# Patient Record
Sex: FEMALE | Race: WHITE | NOT HISPANIC OR LATINO | Employment: OTHER | URBAN - NONMETROPOLITAN AREA
[De-identification: names, ages, dates, MRNs, and addresses within clinical notes are randomized per-mention and may not be internally consistent; named-entity substitution may affect disease eponyms.]

---

## 2019-04-25 ENCOUNTER — OFFICE VISIT (OUTPATIENT)
Dept: CARDIOLOGY | Facility: CLINIC | Age: 59
End: 2019-04-25

## 2019-04-25 VITALS
BODY MASS INDEX: 25.11 KG/M2 | SYSTOLIC BLOOD PRESSURE: 106 MMHG | DIASTOLIC BLOOD PRESSURE: 70 MMHG | HEIGHT: 67 IN | HEART RATE: 61 BPM | WEIGHT: 160 LBS

## 2019-04-25 DIAGNOSIS — E78.00 HYPERCHOLESTEREMIA: ICD-10-CM

## 2019-04-25 DIAGNOSIS — R07.2 PRECORDIAL PAIN: ICD-10-CM

## 2019-04-25 DIAGNOSIS — I10 ESSENTIAL HYPERTENSION: Primary | ICD-10-CM

## 2019-04-25 DIAGNOSIS — R00.2 PALPITATIONS: ICD-10-CM

## 2019-04-25 DIAGNOSIS — Z72.0 TOBACCO USE: ICD-10-CM

## 2019-04-25 DIAGNOSIS — R42 DIZZINESS: ICD-10-CM

## 2019-04-25 DIAGNOSIS — R63.4 WEIGHT LOSS: ICD-10-CM

## 2019-04-25 PROCEDURE — 99244 OFF/OP CNSLTJ NEW/EST MOD 40: CPT | Performed by: INTERNAL MEDICINE

## 2019-04-25 PROCEDURE — 93000 ELECTROCARDIOGRAM COMPLETE: CPT | Performed by: INTERNAL MEDICINE

## 2019-04-25 PROCEDURE — 99406 BEHAV CHNG SMOKING 3-10 MIN: CPT | Performed by: INTERNAL MEDICINE

## 2019-04-25 RX ORDER — CLONAZEPAM 1 MG/1
1 TABLET ORAL NIGHTLY
COMMUNITY
End: 2019-07-25 | Stop reason: ALTCHOICE

## 2019-04-25 RX ORDER — MELOXICAM 15 MG/1
15 TABLET ORAL DAILY
COMMUNITY

## 2019-04-25 RX ORDER — ATORVASTATIN CALCIUM 10 MG/1
10 TABLET, FILM COATED ORAL DAILY
Qty: 30 TABLET | Refills: 11 | Status: SHIPPED | OUTPATIENT
Start: 2019-04-25 | End: 2019-07-25 | Stop reason: DRUGHIGH

## 2019-04-25 RX ORDER — RANITIDINE 300 MG/1
300 TABLET ORAL NIGHTLY
Qty: 30 TABLET | Refills: 8 | Status: SHIPPED | OUTPATIENT
Start: 2019-04-25

## 2019-04-25 RX ORDER — DULOXETIN HYDROCHLORIDE 30 MG/1
30 CAPSULE, DELAYED RELEASE ORAL DAILY
COMMUNITY

## 2019-04-25 RX ORDER — LISINOPRIL AND HYDROCHLOROTHIAZIDE 20; 12.5 MG/1; MG/1
1 TABLET ORAL DAILY
COMMUNITY

## 2019-04-25 RX ORDER — GABAPENTIN 300 MG/1
300 CAPSULE ORAL 2 TIMES DAILY
COMMUNITY

## 2019-04-25 NOTE — PROGRESS NOTES
Chief Complaint   Patient presents with   • Establish Care     Referred for chest pain and HTN.  recently passed away from a heart attack. Has not seen a cardiologist before, thinks she may have had a stress test around 16 years ago before surgery, will attempt to obtain. Reports that sometimes her heart feels like it is running like a horse and gets light headed. Does get short of breath. Reports that she has unintentionally lost weight over the last couple of weeks. Last lab work was done on 19 per PCP, in referral.    • Aspirin     Patient is not on aspirin.         CARDIAC COMPLAINTS  chest pressure/discomfort, dyspnea and fatigue      Subjective   Kasey Torres is a 58 y.o. female came in today for her initial cardiac evaluation.  She has history of hypertension, history of hypercholesterolemia who has been having episodes of chest pain for the last few months.  Her  apparently passed away secondary to heart attack and since then she has noticed chest pain in the form of sharp pain which lasts for few seconds to few minutes and subsides.  She also noticed that her heart races and she gets lightheaded with that.  She also developed shortness of breath during these episodes.  She also has not been eating properly and has also lost weight.  She used to be on a statin which apparently was stopped secondary to the weight loss.  She denies having any syncopal episode.  She denies having any blood in the stool.  She denies having any chest x-ray done for the last couple of years.  She does have constipation but has not  undergone any colonoscopy in the past.  Her lab work which was done at the office in January showed blood sugar of 100, normal liver enzymes, total cholesterol of 229 with the LDL of 148.  She does have a family history of heart disease.  Her brother  at age of 58 secondary to heart attack.  Both her parents  at the age of 60 and they had hypertension.    Past Surgical  History:   Procedure Laterality Date   • CARDIOVASCULAR STRESS TEST  06/24/2004    @ [facility].- 7 Min. 10 METS. 86% THR. EF 59%. Negative.       Current Outpatient Medications   Medication Sig Dispense Refill   • clonazePAM (KlonoPIN) 1 MG tablet Take 1 mg by mouth Every Night.     • DULoxetine (CYMBALTA) 30 MG capsule Take 30 mg by mouth Daily.     • gabapentin (NEURONTIN) 300 MG capsule Take 300 mg by mouth 2 (Two) Times a Day.     • lisinopril-hydrochlorothiazide (PRINZIDE,ZESTORETIC) 20-12.5 MG per tablet Take 1 tablet by mouth Daily.     • meloxicam (MOBIC) 15 MG tablet Take 15 mg by mouth Daily.     • atorvastatin (LIPITOR) 10 MG tablet Take 1 tablet by mouth Daily. 30 tablet 11   • nicotine (NICOTROL) 10 MG inhaler Inhale 1 puff As Needed for Smoking Cessation. 125 inhaler 4   • raNITIdine (ZANTAC) 300 MG tablet Take 1 tablet by mouth Every Night. 30 tablet 8     No current facility-administered medications for this visit.            ALLERGIES:  Patient has no known allergies.    Past Medical History:   Diagnosis Date   • Anxiety    • DDD (degenerative disc disease), cervical    • Dysthymic disorder    • History of neck surgery    • Hx of appendectomy    • Hx of hysterectomy    • Hx of tonsillectomy    • Hypertension    • Insomnia        Social History     Tobacco Use   Smoking Status Current Every Day Smoker   • Packs/day: 0.25   • Types: Cigarettes   Smokeless Tobacco Never Used   Tobacco Comment    1 pack of cigarettes will last here about 2.5 days. Quit in the past using Chantix before, but started smoking when her  passed away.           Family History   Problem Relation Age of Onset   • Hypertension Mother    • Hypertension Father    • Heart attack Brother    • Hypertension Brother    • Heart disease Brother    • Hypertension Brother    • Stroke Brother        Review of Systems   Constitution: Positive for weakness and weight loss. Negative for decreased appetite and malaise/fatigue.  "  HENT: Negative for congestion and sore throat.    Eyes: Negative for blurred vision.   Cardiovascular: Positive for chest pain, dyspnea on exertion, irregular heartbeat and palpitations.   Respiratory: Positive for shortness of breath. Negative for snoring.    Endocrine: Negative for cold intolerance and heat intolerance.   Hematologic/Lymphatic: Negative for adenopathy. Does not bruise/bleed easily.   Skin: Negative for itching, nail changes and skin cancer.   Musculoskeletal: Negative for arthritis and myalgias.   Gastrointestinal: Positive for constipation. Negative for abdominal pain, dysphagia and heartburn.   Genitourinary: Negative for bladder incontinence and frequency.   Neurological: Negative for dizziness, light-headedness, seizures and vertigo.   Psychiatric/Behavioral: Positive for depression. Negative for altered mental status.   Allergic/Immunologic: Negative for environmental allergies and hives.       Diabetes- No  Thyroid- normal    Objective     /70 (BP Location: Right arm)   Pulse 61   Ht 170.2 cm (67\")   Wt 72.6 kg (160 lb)   BMI 25.06 kg/m²     Physical Exam   Constitutional: She is oriented to person, place, and time. She appears well-developed and well-nourished.   HENT:   Head: Normocephalic.   Nose: Nose normal.   Eyes: EOM are normal. Pupils are equal, round, and reactive to light.   Neck: Normal range of motion. Neck supple.   Cardiovascular: Normal rate, regular rhythm, S1 normal and S2 normal.   Murmur heard.  Pulmonary/Chest: Effort normal and breath sounds normal.   Abdominal: Soft. Bowel sounds are normal.   Musculoskeletal: Normal range of motion. She exhibits no edema.   Neurological: She is alert and oriented to person, place, and time.   Skin: Skin is warm and dry.   Psychiatric: She has a normal mood and affect.         ECG 12 Lead  Date/Time: 4/25/2019 12:13 PM  Performed by: Huang Michael MD  Authorized by: Huang Michael MD   Previous ECG: no previous " ECG available  Rhythm: sinus rhythm  Rate: normal  Conduction: non-specific intraventricular conduction delay  QRS axis: normal    Clinical impression: non-specific ECG              Assessment/Plan   Patient's Body mass index is 25.06 kg/m². BMI is above normal parameters. Recommendations include: nutrition counseling.     Ginger was seen today for establish care and aspirin.    Diagnoses and all orders for this visit:    Essential hypertension    Palpitations    Precordial pain  -     Stress Test With Myocardial Perfusion One Day; Future  -     raNITIdine (ZANTAC) 300 MG tablet; Take 1 tablet by mouth Every Night.    Tobacco use  -     nicotine (NICOTROL) 10 MG inhaler; Inhale 1 puff As Needed for Smoking Cessation.    Dizziness  -     Adult Transthoracic Echo Complete W/ Cont if Necessary Per Protocol; Future  -     XR Chest PA & Lateral; Future    Hypercholesteremia  -     atorvastatin (LIPITOR) 10 MG tablet; Take 1 tablet by mouth Daily.    Weight loss  -     XR Chest PA & Lateral; Future    At baseline at heart rate is stable and the blood pressure is normal.  EKG showed normal sinus rhythm, IVCD and nonspecific ST changes. Her clinical examination reveals BMI slightly elevated.  Her cardiovascular examination reveals short systolic murmur at the mitral area.  I had a long talk with her about the smoking and she willing to try to quit with the help of Nicotrol inhalers and prescription was given.  Regarding her hypertension continue the ACE inhibitors.  Regarding her chest pain, it could be related to her GI but she does have cardiac risk.  I started her on Zantac 300 mg once a day and schedule her to undergo a stress dose.  Regarding the dizziness we will get an echocardiogram to rule out any valvular heart disease.  Regarding the palpitation, if during the stress test she has increased chronotropic response on any other arrhythmia, will start her on a beta-blocker.  Regarding the hypercholesterolemia I  started her on Lipitor 10 mg once a day.  Regarding her weight loss, she will need an x-ray chest PA and lateral also talked to her about possibility for EGD and colonoscopy in the future.  Based on the results, further recommendations will be made.  Regarding her mildly elevated BMI, encouraged her to increase her protein intake and reducing carbohydrate intake..               Electronically signed by Huang Michael MD April 25, 2019 12:05 PM

## 2019-04-25 NOTE — PROGRESS NOTES
I advised Kasey of the risks of continuing to use tobacco, and I provided her with tobacco cessation educational materials in the After Visit Summary.     During this visit, I spent 3-4 minutes counseling the patient regarding tobacco cessation.

## 2019-05-01 ENCOUNTER — APPOINTMENT (OUTPATIENT)
Dept: CARDIOLOGY | Facility: HOSPITAL | Age: 59
End: 2019-05-01

## 2019-05-06 ENCOUNTER — TELEPHONE (OUTPATIENT)
Dept: CARDIOLOGY | Facility: CLINIC | Age: 59
End: 2019-05-06

## 2019-07-25 ENCOUNTER — OFFICE VISIT (OUTPATIENT)
Dept: CARDIOLOGY | Facility: CLINIC | Age: 59
End: 2019-07-25

## 2019-07-25 VITALS
HEART RATE: 72 BPM | SYSTOLIC BLOOD PRESSURE: 136 MMHG | WEIGHT: 152.2 LBS | HEIGHT: 67 IN | DIASTOLIC BLOOD PRESSURE: 82 MMHG | BODY MASS INDEX: 23.89 KG/M2

## 2019-07-25 DIAGNOSIS — I10 ESSENTIAL HYPERTENSION: Primary | ICD-10-CM

## 2019-07-25 DIAGNOSIS — Z72.0 TOBACCO USE: ICD-10-CM

## 2019-07-25 DIAGNOSIS — E78.00 HYPERCHOLESTEREMIA: ICD-10-CM

## 2019-07-25 DIAGNOSIS — R07.89 CHEST HEAVINESS: ICD-10-CM

## 2019-07-25 DIAGNOSIS — R06.02 SHORTNESS OF BREATH: ICD-10-CM

## 2019-07-25 DIAGNOSIS — F41.9 ANXIETY: ICD-10-CM

## 2019-07-25 DIAGNOSIS — Q87.40 MARFAN SYNDROME: ICD-10-CM

## 2019-07-25 PROCEDURE — 99214 OFFICE O/P EST MOD 30 MIN: CPT | Performed by: NURSE PRACTITIONER

## 2019-07-25 RX ORDER — DIAZEPAM 5 MG/1
5 TABLET ORAL NIGHTLY
COMMUNITY

## 2019-07-25 RX ORDER — ATORVASTATIN CALCIUM 20 MG/1
20 TABLET, FILM COATED ORAL DAILY
COMMUNITY

## 2019-07-25 RX ORDER — METHOCARBAMOL 750 MG/1
750 TABLET, FILM COATED ORAL 3 TIMES DAILY
COMMUNITY

## 2019-07-25 NOTE — PROGRESS NOTES
Chief Complaint   Patient presents with   • Follow-up     Cardiac management . does not take Aspirin . Most recent labs on chart. . Has recently lost her brother and she said it was thought that he had Marfans -which is genetic . she was unablle to have stress test d/t cost  , she is wondering if an Echo would be cheaper?   • Dizziness     comes and goes.Mainly when standing too fast or bending over.   • Medication refills      No refills needed today. had medication bottles with her today.       Agustín Torres is a 58 y.o. female seen April 2019 for initial cardiac evaluation.  She has history of hypertension and hypercholesterolemia who had been having episodes of chest pain. She reported episodes of heart racing associated with lightheadedness and shortness of breath.  She had stopped statin therapy due to weight loss.  Labs showed blood sugar of 100, normal liver enzymes, total cholesterol of 229 with  LDL of 148.  She does have a family history of heart disease.  At consultation Zantac was added for GI symptoms.  Nicotrol Inhaler prescribed to aid in smoking cessation and Lipitor for cholesterol management.  Cardiac work-up was recommended but she canceled due to cost.    Today she comes to the office for a follow-up visit.  Since her last visit her brother has passed away.  She was told by family that he possibly had Marfan syndrome and that she needed to be evaluated.  She continues to have issues with chest tightness and shortness of breath and is unsure if related mostly to anxiety.  Cymbalta seems to have improved symptoms better than previous medication.  Unfortunately she continues to smoke.     HPI     Cardiac History:    Past Surgical History:   Procedure Laterality Date   • CARDIOVASCULAR STRESS TEST  06/24/2004    @ [facility].- 7 Min. 10 METS. 86% THR. EF 59%. Negative.       Current Outpatient Medications   Medication Sig Dispense Refill   • atorvastatin (LIPITOR) 20 MG tablet  Take 20 mg by mouth Daily.     • diazePAM (VALIUM) 5 MG tablet Take 5 mg by mouth Every Night.     • DULoxetine (CYMBALTA) 30 MG capsule Take 30 mg by mouth Daily.     • gabapentin (NEURONTIN) 300 MG capsule Take 300 mg by mouth 2 (Two) Times a Day.     • lisinopril-hydrochlorothiazide (PRINZIDE,ZESTORETIC) 20-12.5 MG per tablet Take 1 tablet by mouth Daily.     • methocarbamol (ROBAXIN) 750 MG tablet Take 750 mg by mouth 3 (Three) Times a Day.     • nicotine (NICOTROL) 10 MG inhaler Inhale 1 puff As Needed for Smoking Cessation. 125 inhaler 4   • raNITIdine (ZANTAC) 300 MG tablet Take 1 tablet by mouth Every Night. 30 tablet 8   • meloxicam (MOBIC) 15 MG tablet Take 15 mg by mouth Daily.       No current facility-administered medications for this visit.        Patient has no known allergies.    Past Medical History:   Diagnosis Date   • Anxiety    • DDD (degenerative disc disease), cervical    • Dysthymic disorder    • History of neck surgery    • Hx of appendectomy    • Hx of hysterectomy    • Hx of tonsillectomy    • Hypertension    • Insomnia        Social History     Socioeconomic History   • Marital status:      Spouse name: Not on file   • Number of children: Not on file   • Years of education: Not on file   • Highest education level: Not on file   Tobacco Use   • Smoking status: Current Every Day Smoker     Packs/day: 0.25     Types: Cigarettes   • Smokeless tobacco: Never Used   • Tobacco comment: 1 pack of cigarettes will last here about 2.5 days. Quit in the past using Chantix before, but started smoking when her  passed away.    Substance and Sexual Activity   • Alcohol use: No     Frequency: Never   • Drug use: No       Family History   Problem Relation Age of Onset   • Hypertension Mother    • Hypertension Father    • Heart attack Brother    • Hypertension Brother    • Heart disease Brother    • Hypertension Brother    • Stroke Brother        Review of Systems   Constitution: Positive for  "decreased appetite (relates to stress and grief) and weight loss. Negative for diaphoresis, fever and weakness.   HENT: Negative.    Eyes: Negative for redness and visual disturbance.   Cardiovascular: Positive for chest pain. Negative for leg swelling, near-syncope and palpitations.   Respiratory: Positive for cough and shortness of breath. Negative for sleep disturbances due to breathing and sputum production.    Endocrine: Negative for polydipsia, polyphagia and polyuria.   Hematologic/Lymphatic: Negative.    Skin: Negative.    Musculoskeletal: Negative for joint pain and muscle weakness.   Gastrointestinal: Negative.    Genitourinary: Negative.    Neurological: Negative for dizziness, headaches, light-headedness, loss of balance, numbness and paresthesias.   Psychiatric/Behavioral: Positive for depression. Negative for suicidal ideas. The patient is nervous/anxious.         Objective     /82 (BP Location: Left arm)   Pulse 72   Ht 170.2 cm (67\")   Wt 69 kg (152 lb 3.2 oz)   BMI 23.84 kg/m²     Physical Exam   Constitutional: She is oriented to person, place, and time. She appears well-nourished.   HENT:   Head: Normocephalic.   Eyes: Conjunctivae are normal. Pupils are equal, round, and reactive to light.   Neck: Normal range of motion. Neck supple. No JVD present. Carotid bruit is not present.   Cardiovascular: Normal rate, regular rhythm, S1 normal and S2 normal.   No murmur heard.  Pulmonary/Chest: Effort normal and breath sounds normal. She has no wheezes. She has no rales.   Abdominal: Soft. Bowel sounds are normal. She exhibits no distension. There is no tenderness.   Musculoskeletal: Normal range of motion. She exhibits no edema.   Neurological: She is alert and oriented to person, place, and time.   Skin: Skin is warm and dry. No pallor.   Psychiatric: She has a normal mood and affect.        Procedures: none today        Assessment/Plan      Kasey was seen today for follow-up, dizziness and " medication refills.    Diagnoses and all orders for this visit:    Essential hypertension  -     Adult Transthoracic Echo Complete W/ Cont if Necessary Per Protocol; Future    Hypercholesteremia  -     Adult Transthoracic Echo Complete W/ Cont if Necessary Per Protocol; Future    Tobacco use  -     Adult Transthoracic Echo Complete W/ Cont if Necessary Per Protocol; Future    Marfan syndrome  -     Adult Transthoracic Echo Complete W/ Cont if Necessary Per Protocol; Future    Shortness of breath  -     Adult Transthoracic Echo Complete W/ Cont if Necessary Per Protocol; Future    Chest heaviness  -     Adult Transthoracic Echo Complete W/ Cont if Necessary Per Protocol; Future    Anxiety    Ms. Torres is concerned over family history of Marfan syndrome.  She continues to have symptoms that could be cardiac related.  We will reschedule echocardiogram.  Due to cost she wants to wait on having nuclear stress test.    Her blood pressure, heart rate, and heart rhythm today are normal.  The same dose of lisinopril-hydrochlorothiazide advised.    Patient's Body mass index is 23.84 kg/m². BMI is within normal parameters. No follow-up required.  I encouraged her on maintaining current weight and Mediterranean type diet.     She follows with you for lab orders/management.  Please forward a copy of lab results as available.  It is noted the dose of Lipitor was increased and currently tolerating well.    Kasey Torres is a current cigarettes user.  She currently smokes 1 pack of cigarettes per day for a duration of 30 years. I have educated her on the risk of diseases from using tobacco products such as cancer, COPD and heart diease.     I advised her to quit and she is willing to quit. We have discussed the following method/s for tobacco cessation:  Education Material.  Together we have set a quit date for 1 month from today.  She will follow up with me in on prn basis or sooner to check on her progress. I spent 3  minutes  counseling the patient.    She continues to have issues with anxiety however feels some better since addition of Cymbalta.  She will follow with you for management.    Further recommendations based on echo results.  A 6-month follow-up visit scheduled.  Please call sooner for cardiac concerns.            Electronically signed by NE [place]ul[place],  July 25, 2019 12:54 PM

## 2019-08-07 ENCOUNTER — HOSPITAL ENCOUNTER (OUTPATIENT)
Dept: CARDIOLOGY | Facility: HOSPITAL | Age: 59
Discharge: HOME | End: 2019-08-07
Admitting: NURSE PRACTITIONER

## 2019-08-07 DIAGNOSIS — R06.02 SHORTNESS OF BREATH: ICD-10-CM

## 2019-08-07 DIAGNOSIS — Q87.40 MARFAN SYNDROME: ICD-10-CM

## 2019-08-07 DIAGNOSIS — I10 ESSENTIAL HYPERTENSION: ICD-10-CM

## 2019-08-07 DIAGNOSIS — Z72.0 TOBACCO USE: ICD-10-CM

## 2019-08-07 DIAGNOSIS — E78.00 HYPERCHOLESTEREMIA: ICD-10-CM

## 2019-08-07 DIAGNOSIS — R07.89 CHEST HEAVINESS: ICD-10-CM

## 2019-08-07 LAB
BH CV ECHO MEAS - ACS: 2 CM
BH CV ECHO MEAS - AO MEAN PG: 2.7 MMHG
BH CV ECHO MEAS - AO ROOT AREA (BSA CORRECTED): 1.8
BH CV ECHO MEAS - AO ROOT AREA: 8.3 CM^2
BH CV ECHO MEAS - AO ROOT DIAM: 3.2 CM
BH CV ECHO MEAS - AO V2 MEAN: 76.9 CM/SEC
BH CV ECHO MEAS - AO V2 VTI: 21.9 CM
BH CV ECHO MEAS - BSA(HAYCOCK): 1.8 M^2
BH CV ECHO MEAS - BSA: 1.8 M^2
BH CV ECHO MEAS - BZI_BMI: 23.8 KILOGRAMS/M^2
BH CV ECHO MEAS - BZI_METRIC_HEIGHT: 170.2 CM
BH CV ECHO MEAS - BZI_METRIC_WEIGHT: 68.9 KG
BH CV ECHO MEAS - EDV(CUBED): 70.1 ML
BH CV ECHO MEAS - EDV(MOD-SP4): 55 ML
BH CV ECHO MEAS - EDV(TEICH): 75.3 ML
BH CV ECHO MEAS - EF(CUBED): 77.8 %
BH CV ECHO MEAS - EF(MOD-SP4): 72.7 %
BH CV ECHO MEAS - EF(TEICH): 70.4 %
BH CV ECHO MEAS - ESV(CUBED): 15.6 ML
BH CV ECHO MEAS - ESV(MOD-SP4): 15 ML
BH CV ECHO MEAS - ESV(TEICH): 22.3 ML
BH CV ECHO MEAS - FS: 39.4 %
BH CV ECHO MEAS - IVS/LVPW: 1
BH CV ECHO MEAS - IVSD: 1 CM
BH CV ECHO MEAS - LA DIMENSION: 2.6 CM
BH CV ECHO MEAS - LA/AO: 0.81
BH CV ECHO MEAS - LV DIASTOLIC VOL/BSA (35-75): 30.6 ML/M^2
BH CV ECHO MEAS - LV IVRT: 0.11 SEC
BH CV ECHO MEAS - LV MASS(C)D: 138.1 GRAMS
BH CV ECHO MEAS - LV MASS(C)DI: 76.8 GRAMS/M^2
BH CV ECHO MEAS - LV SYSTOLIC VOL/BSA (12-30): 8.3 ML/M^2
BH CV ECHO MEAS - LVIDD: 4.1 CM
BH CV ECHO MEAS - LVIDS: 2.5 CM
BH CV ECHO MEAS - LVLD AP4: 6.2 CM
BH CV ECHO MEAS - LVLS AP4: 5.2 CM
BH CV ECHO MEAS - LVOT AREA (M): 3.8 CM^2
BH CV ECHO MEAS - LVOT AREA: 3.7 CM^2
BH CV ECHO MEAS - LVOT DIAM: 2.2 CM
BH CV ECHO MEAS - LVPWD: 1 CM
BH CV ECHO MEAS - MV A MAX VEL: 68.6 CM/SEC
BH CV ECHO MEAS - MV DEC SLOPE: 299.1 CM/SEC^2
BH CV ECHO MEAS - MV E MAX VEL: 74.5 CM/SEC
BH CV ECHO MEAS - MV E/A: 1.1
BH CV ECHO MEAS - RVDD: 2.5 CM
BH CV ECHO MEAS - SI(AO): 100.8 ML/M^2
BH CV ECHO MEAS - SI(CUBED): 30.3 ML/M^2
BH CV ECHO MEAS - SI(MOD-SP4): 22.2 ML/M^2
BH CV ECHO MEAS - SI(TEICH): 29.4 ML/M^2
BH CV ECHO MEAS - SV(AO): 181.5 ML
BH CV ECHO MEAS - SV(CUBED): 54.5 ML
BH CV ECHO MEAS - SV(MOD-SP4): 40 ML
BH CV ECHO MEAS - SV(TEICH): 53 ML
MAXIMAL PREDICTED HEART RATE: 162 BPM
STRESS TARGET HR: 138 BPM

## 2019-08-07 PROCEDURE — 93306 TTE W/DOPPLER COMPLETE: CPT | Performed by: INTERNAL MEDICINE

## 2019-08-07 PROCEDURE — 93306 TTE W/DOPPLER COMPLETE: CPT

## 2022-06-17 ENCOUNTER — APPOINTMENT (OUTPATIENT)
Dept: CT IMAGING | Facility: HOSPITAL | Age: 62
End: 2022-06-17

## 2022-06-17 ENCOUNTER — HOSPITAL ENCOUNTER (EMERGENCY)
Facility: HOSPITAL | Age: 62
Discharge: HOME | End: 2022-06-18
Admitting: STUDENT IN AN ORGANIZED HEALTH CARE EDUCATION/TRAINING PROGRAM

## 2022-06-17 DIAGNOSIS — R16.0 LIVER MASS: Primary | ICD-10-CM

## 2022-06-17 LAB
ALBUMIN SERPL-MCNC: 4.37 G/DL (ref 3.5–5.2)
ALBUMIN/GLOB SERPL: 1.2 G/DL
ALP SERPL-CCNC: 140 U/L (ref 39–117)
ALT SERPL W P-5'-P-CCNC: 18 U/L (ref 1–33)
AMYLASE SERPL-CCNC: 102 U/L (ref 28–100)
ANION GAP SERPL CALCULATED.3IONS-SCNC: 13.7 MMOL/L (ref 5–15)
AST SERPL-CCNC: 15 U/L (ref 1–32)
BACTERIA UR QL AUTO: ABNORMAL /HPF
BASOPHILS # BLD AUTO: 0.04 10*3/MM3 (ref 0–0.2)
BASOPHILS NFR BLD AUTO: 0.5 % (ref 0–1.5)
BILIRUB SERPL-MCNC: 0.2 MG/DL (ref 0–1.2)
BILIRUB UR QL STRIP: NEGATIVE
BUN SERPL-MCNC: 15 MG/DL (ref 8–23)
BUN/CREAT SERPL: 19 (ref 7–25)
CALCIUM SPEC-SCNC: 9.8 MG/DL (ref 8.6–10.5)
CHLORIDE SERPL-SCNC: 100 MMOL/L (ref 98–107)
CLARITY UR: CLEAR
CO2 SERPL-SCNC: 26.3 MMOL/L (ref 22–29)
COLOR UR: YELLOW
CREAT SERPL-MCNC: 0.79 MG/DL (ref 0.57–1)
DEPRECATED RDW RBC AUTO: 45.6 FL (ref 37–54)
EGFRCR SERPLBLD CKD-EPI 2021: 85.8 ML/MIN/1.73
EOSINOPHIL # BLD AUTO: 0.39 10*3/MM3 (ref 0–0.4)
EOSINOPHIL NFR BLD AUTO: 4.7 % (ref 0.3–6.2)
ERYTHROCYTE [DISTWIDTH] IN BLOOD BY AUTOMATED COUNT: 13.3 % (ref 12.3–15.4)
GLOBULIN UR ELPH-MCNC: 3.6 GM/DL
GLUCOSE SERPL-MCNC: 124 MG/DL (ref 65–99)
GLUCOSE UR STRIP-MCNC: NEGATIVE MG/DL
HCT VFR BLD AUTO: 47.5 % (ref 34–46.6)
HGB BLD-MCNC: 15.4 G/DL (ref 12–15.9)
HGB UR QL STRIP.AUTO: NEGATIVE
HYALINE CASTS UR QL AUTO: ABNORMAL /LPF
IMM GRANULOCYTES # BLD AUTO: 0.05 10*3/MM3 (ref 0–0.05)
IMM GRANULOCYTES NFR BLD AUTO: 0.6 % (ref 0–0.5)
KETONES UR QL STRIP: NEGATIVE
LEUKOCYTE ESTERASE UR QL STRIP.AUTO: ABNORMAL
LIPASE SERPL-CCNC: 48 U/L (ref 13–60)
LYMPHOCYTES # BLD AUTO: 2.05 10*3/MM3 (ref 0.7–3.1)
LYMPHOCYTES NFR BLD AUTO: 24.8 % (ref 19.6–45.3)
MCH RBC QN AUTO: 29.8 PG (ref 26.6–33)
MCHC RBC AUTO-ENTMCNC: 32.4 G/DL (ref 31.5–35.7)
MCV RBC AUTO: 91.9 FL (ref 79–97)
MONOCYTES # BLD AUTO: 0.69 10*3/MM3 (ref 0.1–0.9)
MONOCYTES NFR BLD AUTO: 8.4 % (ref 5–12)
NEUTROPHILS NFR BLD AUTO: 5.04 10*3/MM3 (ref 1.7–7)
NEUTROPHILS NFR BLD AUTO: 61 % (ref 42.7–76)
NITRITE UR QL STRIP: NEGATIVE
NRBC BLD AUTO-RTO: 0 /100 WBC (ref 0–0.2)
PH UR STRIP.AUTO: 5.5 [PH] (ref 5–8)
PLATELET # BLD AUTO: 286 10*3/MM3 (ref 140–450)
PMV BLD AUTO: 10.1 FL (ref 6–12)
POTASSIUM SERPL-SCNC: 4 MMOL/L (ref 3.5–5.2)
PROT SERPL-MCNC: 8 G/DL (ref 6–8.5)
PROT UR QL STRIP: NEGATIVE
RBC # BLD AUTO: 5.17 10*6/MM3 (ref 3.77–5.28)
RBC # UR STRIP: ABNORMAL /HPF
REF LAB TEST METHOD: ABNORMAL
SODIUM SERPL-SCNC: 140 MMOL/L (ref 136–145)
SP GR UR STRIP: 1.02 (ref 1–1.03)
SQUAMOUS #/AREA URNS HPF: ABNORMAL /HPF
UROBILINOGEN UR QL STRIP: ABNORMAL
WBC # UR STRIP: ABNORMAL /HPF
WBC NRBC COR # BLD: 8.26 10*3/MM3 (ref 3.4–10.8)

## 2022-06-17 PROCEDURE — 96376 TX/PRO/DX INJ SAME DRUG ADON: CPT

## 2022-06-17 PROCEDURE — 25010000002 HYDROMORPHONE PER 4 MG: Performed by: NURSE PRACTITIONER

## 2022-06-17 PROCEDURE — 74176 CT ABD & PELVIS W/O CONTRAST: CPT

## 2022-06-17 PROCEDURE — 83690 ASSAY OF LIPASE: CPT | Performed by: NURSE PRACTITIONER

## 2022-06-17 PROCEDURE — 80053 COMPREHEN METABOLIC PANEL: CPT | Performed by: NURSE PRACTITIONER

## 2022-06-17 PROCEDURE — 25010000002 ONDANSETRON PER 1 MG: Performed by: NURSE PRACTITIONER

## 2022-06-17 PROCEDURE — 74177 CT ABD & PELVIS W/CONTRAST: CPT

## 2022-06-17 PROCEDURE — 96375 TX/PRO/DX INJ NEW DRUG ADDON: CPT

## 2022-06-17 PROCEDURE — 81001 URINALYSIS AUTO W/SCOPE: CPT | Performed by: NURSE PRACTITIONER

## 2022-06-17 PROCEDURE — 99283 EMERGENCY DEPT VISIT LOW MDM: CPT

## 2022-06-17 PROCEDURE — 36415 COLL VENOUS BLD VENIPUNCTURE: CPT

## 2022-06-17 PROCEDURE — 25010000002 IOPAMIDOL 61 % SOLUTION: Performed by: NURSE PRACTITIONER

## 2022-06-17 PROCEDURE — 85025 COMPLETE CBC W/AUTO DIFF WBC: CPT | Performed by: NURSE PRACTITIONER

## 2022-06-17 PROCEDURE — 82150 ASSAY OF AMYLASE: CPT | Performed by: NURSE PRACTITIONER

## 2022-06-17 PROCEDURE — 96361 HYDRATE IV INFUSION ADD-ON: CPT

## 2022-06-17 PROCEDURE — 96374 THER/PROPH/DIAG INJ IV PUSH: CPT

## 2022-06-17 RX ORDER — HYDROMORPHONE HYDROCHLORIDE 1 MG/ML
0.25 INJECTION, SOLUTION INTRAMUSCULAR; INTRAVENOUS; SUBCUTANEOUS ONCE
Status: COMPLETED | OUTPATIENT
Start: 2022-06-17 | End: 2022-06-17

## 2022-06-17 RX ORDER — ONDANSETRON 2 MG/ML
4 INJECTION INTRAMUSCULAR; INTRAVENOUS ONCE
Status: COMPLETED | OUTPATIENT
Start: 2022-06-17 | End: 2022-06-17

## 2022-06-17 RX ORDER — ONDANSETRON 2 MG/ML
4 INJECTION INTRAMUSCULAR; INTRAVENOUS ONCE
Status: DISCONTINUED | OUTPATIENT
Start: 2022-06-17 | End: 2022-06-17

## 2022-06-17 RX ORDER — SODIUM CHLORIDE 0.9 % (FLUSH) 0.9 %
10 SYRINGE (ML) INJECTION AS NEEDED
Status: DISCONTINUED | OUTPATIENT
Start: 2022-06-17 | End: 2022-06-18 | Stop reason: HOSPADM

## 2022-06-17 RX ORDER — HYDROMORPHONE HYDROCHLORIDE 1 MG/ML
0.5 INJECTION, SOLUTION INTRAMUSCULAR; INTRAVENOUS; SUBCUTANEOUS ONCE
Status: COMPLETED | OUTPATIENT
Start: 2022-06-17 | End: 2022-06-17

## 2022-06-17 RX ORDER — KETOROLAC TROMETHAMINE 30 MG/ML
60 INJECTION, SOLUTION INTRAMUSCULAR; INTRAVENOUS ONCE
Status: DISCONTINUED | OUTPATIENT
Start: 2022-06-17 | End: 2022-06-17

## 2022-06-17 RX ADMIN — HYDROMORPHONE HYDROCHLORIDE 0.5 MG: 1 INJECTION, SOLUTION INTRAMUSCULAR; INTRAVENOUS; SUBCUTANEOUS at 23:55

## 2022-06-17 RX ADMIN — SODIUM CHLORIDE 1000 ML: 9 INJECTION, SOLUTION INTRAVENOUS at 19:50

## 2022-06-17 RX ADMIN — ONDANSETRON 4 MG: 2 INJECTION INTRAMUSCULAR; INTRAVENOUS at 19:51

## 2022-06-17 RX ADMIN — ONDANSETRON 4 MG: 2 INJECTION INTRAMUSCULAR; INTRAVENOUS at 23:55

## 2022-06-17 RX ADMIN — HYDROMORPHONE HYDROCHLORIDE 0.25 MG: 1 INJECTION, SOLUTION INTRAMUSCULAR; INTRAVENOUS; SUBCUTANEOUS at 19:50

## 2022-06-17 RX ADMIN — IOPAMIDOL 80 ML: 612 INJECTION, SOLUTION INTRAVENOUS at 23:19

## 2022-06-17 NOTE — ED PROVIDER NOTES
Subjective   Patient reports trouble with bowel movements. Reports bowel no bowel movement in 2 weeks despite miralax, ex-lax, mag citrate, and enemas      Abdominal Pain  Pain location:  Generalized  Pain quality: aching    Pain radiates to:  Does not radiate  Pain severity:  Moderate  Onset quality:  Sudden  Duration:  4 weeks  Timing:  Constant  Progression:  Worsening  Chronicity:  New  Context: not alcohol use, not diet changes, not recent illness and not sick contacts    Relieved by:  Nothing  Worsened by:  Nothing  Ineffective treatments:  None tried  Associated symptoms: vomiting    Associated symptoms: no anorexia, no chills, no cough, no fatigue, no hematemesis, no melena and no shortness of breath    Risk factors: no alcohol abuse, no NSAID use and no recent hospitalization        Review of Systems   Constitutional: Negative.  Negative for chills and fatigue.   HENT: Negative.    Eyes: Negative.    Respiratory: Negative.  Negative for cough and shortness of breath.    Cardiovascular: Negative.    Gastrointestinal: Positive for abdominal pain and vomiting. Negative for anorexia, hematemesis and melena.   Endocrine: Negative.    Genitourinary: Negative.    Musculoskeletal: Negative.    Skin: Negative.    Allergic/Immunologic: Negative.    Neurological: Negative.    Hematological: Negative.    Psychiatric/Behavioral: Negative.        Past Medical History:   Diagnosis Date   • Anxiety    • DDD (degenerative disc disease), cervical    • Dysthymic disorder    • History of neck surgery    • Hx of appendectomy    • Hx of hysterectomy    • Hx of tonsillectomy    • Hypertension    • Insomnia        No Known Allergies    Past Surgical History:   Procedure Laterality Date   • CARDIOVASCULAR STRESS TEST  06/24/2004    @ [facility].- 7 Min. 10 METS. 86% THR. EF 59%. Negative.   • ECHO - CONVERTED  08/07/2019    EF 65%. Trace-Mild MR       Family History   Problem Relation Age of Onset   • Hypertension Mother    •  Hypertension Father    • Heart attack Brother    • Hypertension Brother    • Heart disease Brother    • Hypertension Brother    • Stroke Brother        Social History     Socioeconomic History   • Marital status:    Tobacco Use   • Smoking status: Current Every Day Smoker     Packs/day: 0.25     Types: Cigarettes   • Smokeless tobacco: Never Used   • Tobacco comment: 1 pack of cigarettes will last here about 2.5 days. Quit in the past using Chantix before, but started smoking when her  passed away.    Substance and Sexual Activity   • Alcohol use: No   • Drug use: No           Objective   Physical Exam  Vitals and nursing note reviewed.   Constitutional:       Appearance: She is well-developed.   HENT:      Head: Normocephalic.      Right Ear: External ear normal.      Left Ear: External ear normal.   Eyes:      Conjunctiva/sclera: Conjunctivae normal.      Pupils: Pupils are equal, round, and reactive to light.   Cardiovascular:      Rate and Rhythm: Normal rate and regular rhythm.      Heart sounds: Normal heart sounds.   Pulmonary:      Effort: Pulmonary effort is normal.      Breath sounds: Normal breath sounds.   Abdominal:      General: Bowel sounds are normal.      Palpations: Abdomen is soft.   Musculoskeletal:         General: Normal range of motion.      Cervical back: Normal range of motion and neck supple.   Skin:     General: Skin is warm and dry.      Capillary Refill: Capillary refill takes less than 2 seconds.   Neurological:      Mental Status: She is alert and oriented to person, place, and time.   Psychiatric:         Behavior: Behavior normal.         Thought Content: Thought content normal.         Procedures       Results for orders placed or performed during the hospital encounter of 06/17/22   Comprehensive Metabolic Panel    Specimen: Blood   Result Value Ref Range    Glucose 124 (H) 65 - 99 mg/dL    BUN 15 8 - 23 mg/dL    Creatinine 0.79 0.57 - 1.00 mg/dL    Sodium 140 136 -  145 mmol/L    Potassium 4.0 3.5 - 5.2 mmol/L    Chloride 100 98 - 107 mmol/L    CO2 26.3 22.0 - 29.0 mmol/L    Calcium 9.8 8.6 - 10.5 mg/dL    Total Protein 8.0 6.0 - 8.5 g/dL    Albumin 4.37 3.50 - 5.20 g/dL    ALT (SGPT) 18 1 - 33 U/L    AST (SGOT) 15 1 - 32 U/L    Alkaline Phosphatase 140 (H) 39 - 117 U/L    Total Bilirubin 0.2 0.0 - 1.2 mg/dL    Globulin 3.6 gm/dL    A/G Ratio 1.2 g/dL    BUN/Creatinine Ratio 19.0 7.0 - 25.0    Anion Gap 13.7 5.0 - 15.0 mmol/L    eGFR 85.8 >60.0 mL/min/1.73   Urinalysis With Microscopic If Indicated (No Culture) - Urine, Clean Catch    Specimen: Urine, Clean Catch   Result Value Ref Range    Color, UA Yellow Yellow, Straw    Appearance, UA Clear Clear    pH, UA 5.5 5.0 - 8.0    Specific Gravity, UA 1.021 1.005 - 1.030    Glucose, UA Negative Negative    Ketones, UA Negative Negative    Bilirubin, UA Negative Negative    Blood, UA Negative Negative    Protein, UA Negative Negative    Leuk Esterase, UA Small (1+) (A) Negative    Nitrite, UA Negative Negative    Urobilinogen, UA 1.0 E.U./dL 0.2 - 1.0 E.U./dL   Amylase    Specimen: Blood   Result Value Ref Range    Amylase 102 (H) 28 - 100 U/L   Lipase    Specimen: Blood   Result Value Ref Range    Lipase 48 13 - 60 U/L   CBC Auto Differential    Specimen: Blood   Result Value Ref Range    WBC 8.26 3.40 - 10.80 10*3/mm3    RBC 5.17 3.77 - 5.28 10*6/mm3    Hemoglobin 15.4 12.0 - 15.9 g/dL    Hematocrit 47.5 (H) 34.0 - 46.6 %    MCV 91.9 79.0 - 97.0 fL    MCH 29.8 26.6 - 33.0 pg    MCHC 32.4 31.5 - 35.7 g/dL    RDW 13.3 12.3 - 15.4 %    RDW-SD 45.6 37.0 - 54.0 fl    MPV 10.1 6.0 - 12.0 fL    Platelets 286 140 - 450 10*3/mm3    Neutrophil % 61.0 42.7 - 76.0 %    Lymphocyte % 24.8 19.6 - 45.3 %    Monocyte % 8.4 5.0 - 12.0 %    Eosinophil % 4.7 0.3 - 6.2 %    Basophil % 0.5 0.0 - 1.5 %    Immature Grans % 0.6 (H) 0.0 - 0.5 %    Neutrophils, Absolute 5.04 1.70 - 7.00 10*3/mm3    Lymphocytes, Absolute 2.05 0.70 - 3.10 10*3/mm3     Monocytes, Absolute 0.69 0.10 - 0.90 10*3/mm3    Eosinophils, Absolute 0.39 0.00 - 0.40 10*3/mm3    Basophils, Absolute 0.04 0.00 - 0.20 10*3/mm3    Immature Grans, Absolute 0.05 0.00 - 0.05 10*3/mm3    nRBC 0.0 0.0 - 0.2 /100 WBC   Urinalysis, Microscopic Only - Urine, Clean Catch    Specimen: Urine, Clean Catch   Result Value Ref Range    RBC, UA 0-2 None Seen, 0-2 /HPF    WBC, UA 6-12 (A) None Seen, 0-2 /HPF    Bacteria, UA None Seen None Seen /HPF    Squamous Epithelial Cells, UA 3-6 (A) None Seen, 0-2 /HPF    Hyaline Casts, UA None Seen None Seen /LPF    Methodology Automated Microscopy        ED Course  ED Course as of 06/18/22 2053 Fri Jun 17, 2022 2207 Endorsed to Rhiannon Anaya [LUISA]   Sat Jun 18, 2022   0017 CT Abdomen Pelvis With Contrast  IMPRESSION:     1. Heterogeneous mass in the quadrate lobe of the liver, detailed above. This is highly concerning for primary hepatic malignancy versus metastatic disease. The gallbladder is also markedly abnormal with diffuse wall thickening and questionable  infiltrative process throughout the gallbladder. Gallbladder malignancy versus spread of hepatic malignancy is not excluded on the exam. The common bile duct is dilated to 8 mm. No definite involvement of the pancreas. This can be further assessed with a  nonemergent contrast-enhanced hepatic mass protocol MRI of the abdomen.  2. 3.5 cm infrarenal abdominal aortic aneurysm without dissection.  3. Descending and sigmoid colonic diverticulosis. There is questionable long segment irregular wall thickening of the sigmoid colon. This is nonspecific and could be secondary to incomplete distention of the colon. However, colitis versus colonic  malignancy is not excluded. Correlation with clinical symptoms and follow-up colonoscopy is recommended. Large stool burden throughout the colon.  4. Hysterectomy. [MB]   0019 Discussed CT findings with patient and friend. CT abdomen is highly concerning for liver or gallbladder  malignancy with possibility this could be metastatic disease. Patient understands severity of findings and need for urgent follow up. I have contacted  and spoke with coordinator Dr. Campbell.  is arranging outpatient follow up for patient with GI and/or Oncology.  scheduling will contact patient early next week to give her appointment details. I have instructed her to bring her disc that we have printed when she has her appointment at . Patient and friend verbalize understanding and plan of care.  [MB]      ED Course User Index  [LUISA] Lobito Teague APRN  [MB] Rhiannon Anaya APRN                                             Electronically signed by NE Bal, 06/17/22, 10:08 PM EDT.      MDM    Final diagnoses:   Liver mass       ED Disposition  ED Disposition     ED Disposition   Discharge    Condition   Stable    Comment   --             Jackie Orozco APRN  [address] [facility] [address] [phone]    Call in 2 days      [facility] will call you next week to schedule a follow up with Gastroenterology and or Oncology.  Go to            Medication List      New Prescriptions    HYDROcodone-acetaminophen 7.5-325 MG per tablet  Commonly known as: NORCO  Take 1 tablet by mouth Every 6 (Six) Hours As Needed for Moderate Pain  for up to 3 days.           Where to Get Your Medications      These medications were sent to [id number] - [place] [address] AT [place] [address] [address] [facility] [phone] [facility] [phone] [address] [address]    Phone: [phone]   · HYDROcodone-acetaminophen 7.5-325 MG per tablet          Rhiannon Anaya APRN  06/18/22 8123

## 2022-06-18 VITALS
HEART RATE: 77 BPM | OXYGEN SATURATION: 98 % | DIASTOLIC BLOOD PRESSURE: 93 MMHG | SYSTOLIC BLOOD PRESSURE: 177 MMHG | BODY MASS INDEX: 19.15 KG/M2 | HEIGHT: 67 IN | RESPIRATION RATE: 16 BRPM | TEMPERATURE: 98 F | WEIGHT: 122 LBS

## 2022-06-18 RX ORDER — LISINOPRIL 10 MG/1
20 TABLET ORAL ONCE
Status: COMPLETED | OUTPATIENT
Start: 2022-06-18 | End: 2022-06-18

## 2022-06-18 RX ORDER — HYDROCODONE BITARTRATE AND ACETAMINOPHEN 7.5; 325 MG/1; MG/1
1 TABLET ORAL EVERY 6 HOURS PRN
Qty: 12 TABLET | Refills: 0 | Status: SHIPPED | OUTPATIENT
Start: 2022-06-18 | End: 2022-06-21

## 2022-06-18 RX ORDER — MAGNESIUM CARB/ALUMINUM HYDROX 105-160MG
296 TABLET,CHEWABLE ORAL ONCE
Status: COMPLETED | OUTPATIENT
Start: 2022-06-18 | End: 2022-06-18

## 2022-06-18 RX ADMIN — LISINOPRIL 20 MG: 10 TABLET ORAL at 00:44

## 2022-06-18 RX ADMIN — MAGNESIUM CITRATE 296 ML: 1.75 LIQUID ORAL at 00:46

## 2022-07-09 ENCOUNTER — HOSPITAL ENCOUNTER (EMERGENCY)
Dept: HOSPITAL 79 - ER1 | Age: 62
Discharge: OTHER INSTITUTION | End: 2022-07-09
Payer: COMMERCIAL

## 2022-07-09 DIAGNOSIS — I71.4: ICD-10-CM

## 2022-07-09 DIAGNOSIS — I10: ICD-10-CM

## 2022-07-09 DIAGNOSIS — R16.0: ICD-10-CM

## 2022-07-09 DIAGNOSIS — K59.00: Primary | ICD-10-CM

## 2022-07-09 DIAGNOSIS — F17.210: ICD-10-CM

## 2022-07-09 LAB
BUN/CREATININE RATIO: 16 (ref 0–10)
HGB BLD-MCNC: 15.5 GM/DL (ref 12.3–15.3)
RED BLOOD COUNT: 5.12 M/UL (ref 4–5.1)
WHITE BLOOD COUNT: 7.1 K/UL (ref 4.5–11)

## 2022-07-19 ENCOUNTER — HOSPITAL ENCOUNTER (OUTPATIENT)
Dept: HOSPITAL 79 - US | Age: 62
End: 2022-07-19
Attending: INTERNAL MEDICINE
Payer: COMMERCIAL

## 2022-07-19 DIAGNOSIS — C79.2: Primary | ICD-10-CM

## 2022-07-19 LAB — BUN/CREATININE RATIO: 17 (ref 0–10)

## 2022-07-20 ENCOUNTER — TELEPHONE (OUTPATIENT)
Dept: ONCOLOGY | Facility: CLINIC | Age: 62
End: 2022-07-20

## 2022-07-20 NOTE — TELEPHONE ENCOUNTER
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  SHE HAD A REFERRAL OUT FOR DR LEGGETT, BUT WAS DENIED DUE TO NORMAL PATH REPORT.    FRANKIE HAD ANOTHER BIOPSY YESTERDAY AT [facility]. THAT IS WHERE THEY FOUND THE CANCER.    ARIANA IS WANTING TO BE SEEN BY DR LEGGETT FOR A SECOND OPINION     Do you require a callback: YES

## 2022-08-01 ENCOUNTER — CONSULT (OUTPATIENT)
Dept: ONCOLOGY | Facility: CLINIC | Age: 62
End: 2022-08-01

## 2022-08-01 VITALS
BODY MASS INDEX: 18.46 KG/M2 | OXYGEN SATURATION: 98 % | HEART RATE: 76 BPM | RESPIRATION RATE: 18 BRPM | WEIGHT: 117.6 LBS | DIASTOLIC BLOOD PRESSURE: 81 MMHG | HEIGHT: 67 IN | SYSTOLIC BLOOD PRESSURE: 152 MMHG | TEMPERATURE: 96.4 F

## 2022-08-01 DIAGNOSIS — K59.00 CONSTIPATION, UNSPECIFIED CONSTIPATION TYPE: ICD-10-CM

## 2022-08-01 DIAGNOSIS — C78.7 METASTASIS TO LIVER: ICD-10-CM

## 2022-08-01 DIAGNOSIS — C78.6 METASTASIS TO PERITONEUM: ICD-10-CM

## 2022-08-01 DIAGNOSIS — G89.3 NEOPLASM RELATED PAIN: ICD-10-CM

## 2022-08-01 DIAGNOSIS — F33.1 MODERATE EPISODE OF RECURRENT MAJOR DEPRESSIVE DISORDER: ICD-10-CM

## 2022-08-01 DIAGNOSIS — C23 GALLBLADDER CANCER, CARCINOMA: Primary | ICD-10-CM

## 2022-08-01 PROCEDURE — 99205 OFFICE O/P NEW HI 60 MIN: CPT | Performed by: INTERNAL MEDICINE

## 2022-08-01 RX ORDER — LISINOPRIL 10 MG/1
10 TABLET ORAL DAILY
COMMUNITY

## 2022-08-01 RX ORDER — CHOLECALCIFEROL (VITAMIN D3) 125 MCG
5 CAPSULE ORAL NIGHTLY
COMMUNITY

## 2022-08-01 RX ORDER — OXYCODONE AND ACETAMINOPHEN 10; 325 MG/1; MG/1
1 TABLET ORAL EVERY 4 HOURS PRN
COMMUNITY

## 2022-08-01 RX ORDER — ONDANSETRON HYDROCHLORIDE 8 MG/1
TABLET, FILM COATED ORAL EVERY 8 HOURS PRN
COMMUNITY

## 2022-08-01 RX ORDER — DICYCLOMINE HCL 20 MG
20 TABLET ORAL EVERY 6 HOURS
COMMUNITY

## 2022-08-01 RX ORDER — MORPHINE SULFATE 15 MG/1
15 TABLET, FILM COATED, EXTENDED RELEASE ORAL 2 TIMES DAILY
COMMUNITY

## 2022-08-01 RX ORDER — MELATONIN 10 MG
10 CAPSULE ORAL NIGHTLY
COMMUNITY

## 2022-08-02 ENCOUNTER — HOSPITAL ENCOUNTER (OUTPATIENT)
Dept: HOSPITAL 79 - OR | Age: 62
Discharge: HOME | End: 2022-08-02
Attending: SURGERY
Payer: COMMERCIAL

## 2022-08-02 VITALS — BODY MASS INDEX: 18.21 KG/M2 | WEIGHT: 116 LBS | HEIGHT: 67 IN

## 2022-08-02 DIAGNOSIS — C23: Primary | ICD-10-CM

## 2022-08-02 DIAGNOSIS — F17.200: ICD-10-CM

## 2022-08-02 LAB — BUN/CREATININE RATIO: 24 (ref 0–10)

## 2022-08-02 PROCEDURE — C1769 GUIDE WIRE: HCPCS

## 2022-08-02 PROCEDURE — C1788 PORT, INDWELLING, IMP: HCPCS

## 2022-09-28 ENCOUNTER — HOSPITAL ENCOUNTER (OUTPATIENT)
Dept: HOSPITAL 79 - CT | Age: 62
End: 2022-09-28
Attending: INTERNAL MEDICINE
Payer: COMMERCIAL

## 2022-09-28 VITALS — BODY MASS INDEX: 17.42 KG/M2 | HEIGHT: 67 IN | WEIGHT: 111 LBS

## 2022-09-28 DIAGNOSIS — Z45.2: Primary | ICD-10-CM

## 2022-09-28 DIAGNOSIS — C23: ICD-10-CM
